# Patient Record
Sex: MALE | Race: WHITE | URBAN - NONMETROPOLITAN AREA
[De-identification: names, ages, dates, MRNs, and addresses within clinical notes are randomized per-mention and may not be internally consistent; named-entity substitution may affect disease eponyms.]

---

## 2022-03-03 ENCOUNTER — NEW PATIENT (OUTPATIENT)
Dept: URBAN - NONMETROPOLITAN AREA CLINIC 4 | Facility: CLINIC | Age: 22
End: 2022-03-03

## 2022-03-03 DIAGNOSIS — H52.223: ICD-10-CM

## 2022-03-03 DIAGNOSIS — H52.13: ICD-10-CM

## 2022-03-03 PROCEDURE — S0620 ROUTINE OPHTHALMOLOGICAL EXA: HCPCS

## 2022-03-03 PROCEDURE — 92310 CONTACT LENS FITTING OU: CPT

## 2022-03-03 ASSESSMENT — VISUAL ACUITY
OD_PH: 20/30
OD_SC: 20/400
OS_SC: 20/400
OS_PH: 20/50

## 2022-03-03 ASSESSMENT — TONOMETRY
OD_IOP_MMHG: 15
OS_IOP_MMHG: 15
